# Patient Record
Sex: FEMALE | Race: WHITE | URBAN - METROPOLITAN AREA
[De-identification: names, ages, dates, MRNs, and addresses within clinical notes are randomized per-mention and may not be internally consistent; named-entity substitution may affect disease eponyms.]

---

## 2018-07-25 ENCOUNTER — IMPORTED ENCOUNTER (OUTPATIENT)
Dept: URBAN - METROPOLITAN AREA CLINIC 9 | Facility: CLINIC | Age: 68
End: 2018-07-25

## 2019-06-13 NOTE — PATIENT DISCUSSION
- Refractive goal: [place]. Does not mind wearing readers for near. Would not mind wearing glasses full time.

## 2019-08-15 NOTE — PATIENT DISCUSSION
Continue: prednisol ace-gatiflox-bromfen (prednisol ace-gatiflox-bromfen): drops,suspension: 1-0.5-0.075% 1 drop three times a day into affected eye 07-

## 2019-08-21 NOTE — PATIENT DISCUSSION
- Refractive goal: [place]. Does not mind wearing readers for near. Would not mind wearing glasses full time.

## 2019-10-02 NOTE — PATIENT DISCUSSION
- With rapidly magali anterior capsular phimosis, rhexis size now 1.2 mm.  Patient notices reduction in her vision

## 2019-10-02 NOTE — PATIENT DISCUSSION
- Will re-evaluate in 2 weeks.  Consider YAG of the anterior capsule to create relaxing incisions that day

## 2019-10-21 NOTE — PATIENT DISCUSSION
Continue: prednisol ace-gatiflox-bromfen (prednisol ace-gatiflox-bromfen): drops,suspension: 1-0.5-0.075% 1 drop three times a day into affected eye 07- week(s)

## 2019-10-21 NOTE — PATIENT DISCUSSION
- Vision has been worsening OS over the past few weeks due to severe anterior capsuar phimosis, rhexis now only 1.0 mm in diameter, visual acuity 20/100

## 2019-10-21 NOTE — PATIENT DISCUSSION
- YAG laser performed today on the anterior capsular phimosis. 7 relaxing incisions were created, with notable enlargement of the rhexis size.  Patient tolerated the procedure well

## 2019-11-07 NOTE — PATIENT DISCUSSION
- Vision has been worsening OS over the past few weeks, initially thought to be due only to severe anterior capsuar phimosis.  Now s/p YAG of anterior capsular phimosis, with improvement of rhexis size from 1 mm to 3.5 mm

## 2019-11-14 NOTE — PATIENT DISCUSSION
- CME present on OCT macula OS. Trace IRF OD. Will observe for now.  Advised patient to call with any changes in vision

## 2019-12-12 NOTE — PATIENT DISCUSSION
Stopped Today: prednisol ace-gatiflox-bromfen (prednisol ace-gatiflox-bromfen): drops,suspension: 1-0.5-0.075% 1 drop three times a day into affected eye 07-

## 2020-08-06 NOTE — PATIENT DISCUSSION
- NORMAL OCT MAC TODAY Add 04390 Cpt? (Important Note: In 2017 The Use Of 51462 Is Being Tracked By Cms To Determine Future Global Period Reimbursement For Global Periods): yes Detail Level: Detailed

## 2021-07-01 ENCOUNTER — IMPORTED ENCOUNTER (OUTPATIENT)
Dept: URBAN - METROPOLITAN AREA CLINIC 9 | Facility: CLINIC | Age: 71
End: 2021-07-01

## 2021-07-12 ENCOUNTER — IMPORTED ENCOUNTER (OUTPATIENT)
Dept: URBAN - METROPOLITAN AREA CLINIC 9 | Facility: CLINIC | Age: 71
End: 2021-07-12

## 2021-08-26 ENCOUNTER — IMPORTED ENCOUNTER (OUTPATIENT)
Dept: URBAN - METROPOLITAN AREA CLINIC 9 | Facility: CLINIC | Age: 71
End: 2021-08-26

## 2021-09-20 ENCOUNTER — IMPORTED ENCOUNTER (OUTPATIENT)
Dept: URBAN - METROPOLITAN AREA CLINIC 9 | Facility: CLINIC | Age: 71
End: 2021-09-20

## 2021-10-18 ASSESSMENT — VISUAL ACUITY
OD_SC: 20/30 SN
OD_SC: 20/30 SN
OD_CC: 20/30 SN
OS_CC: 20/30 SN
OD_CC: 20/40 SN
OS_CC: 20/60 -2 SN
OD_CC: 20/25 SN
OD_CC: 20/30 - SN
OS_CC: 20/50 SN
OS_CC: 20/40 - SN
OD_CC: 20/30 SN
OD_CC: 20/30 SN
OD_CC: 20/40 SN
OS_CC: 20/30 -2 SN
OD_CC: 20/50 -2 SN
OS_SC: 20/40 SN
OD_CC: 20/60 SN
OS_CC: 20/50 - SN

## 2021-10-18 ASSESSMENT — KERATOMETRY
OD_K2POWER_DIOPTERS: 42
OS_K1POWER_DIOPTERS: 41.5
OS_AXISANGLE2_DEGREES: 101
OD_K2POWER_DIOPTERS: 42.5
OS_K2POWER_DIOPTERS: 41.75
OS_AXISANGLE2_DEGREES: 136
OS_AXISANGLE_DEGREES: 46
OD_AXISANGLE2_DEGREES: 11
OD_AXISANGLE_DEGREES: 101
OD_AXISANGLE2_DEGREES: 13
OS_AXISANGLE_DEGREES: 11
OS_K1POWER_DIOPTERS: 41
OD_AXISANGLE_DEGREES: 103
OS_K2POWER_DIOPTERS: 41.75
OD_K1POWER_DIOPTERS: 41
OD_K1POWER_DIOPTERS: 41

## 2021-10-18 ASSESSMENT — TONOMETRY
OD_IOP_MMHG: 13
OS_IOP_MMHG: 13
OS_IOP_MMHG: 12
OS_IOP_MMHG: 13
OD_IOP_MMHG: 12
OD_IOP_MMHG: 13

## 2022-09-15 ENCOUNTER — ESTABLISHED PATIENT (OUTPATIENT)
Dept: URBAN - METROPOLITAN AREA CLINIC 4 | Facility: CLINIC | Age: 72
End: 2022-09-15

## 2022-09-15 DIAGNOSIS — E11.3293: ICD-10-CM

## 2022-09-15 DIAGNOSIS — H26.493: ICD-10-CM

## 2022-09-15 DIAGNOSIS — H11.153: ICD-10-CM

## 2022-09-15 PROCEDURE — 92014 COMPRE OPH EXAM EST PT 1/>: CPT

## 2022-09-15 PROCEDURE — 92015 DETERMINE REFRACTIVE STATE: CPT

## 2022-09-15 ASSESSMENT — VISUAL ACUITY
OD_SC: 20/50
OS_SC: 20/70
OS_GLARE: 20/100
OU_SC: 20/60
OD_GLARE: 20/100-1

## 2022-09-15 ASSESSMENT — KERATOMETRY
OS_K1POWER_DIOPTERS: 41.00
OD_K2POWER_DIOPTERS: 42.25
OD_AXISANGLE2_DEGREES: 18
OS_K2POWER_DIOPTERS: 41.75
OS_AXISANGLE2_DEGREES: 141
OD_AXISANGLE_DEGREES: 108
OD_K1POWER_DIOPTERS: 41.25
OS_AXISANGLE_DEGREES: 51

## 2022-09-15 ASSESSMENT — TONOMETRY
OD_IOP_MMHG: 14
OS_IOP_MMHG: 14

## 2022-11-02 ENCOUNTER — POST-OP (OUTPATIENT)
Dept: URBAN - METROPOLITAN AREA CLINIC 4 | Facility: CLINIC | Age: 72
End: 2022-11-02

## 2022-11-02 DIAGNOSIS — E11.3293: ICD-10-CM

## 2022-11-02 DIAGNOSIS — H26.493: ICD-10-CM

## 2022-11-02 DIAGNOSIS — H11.153: ICD-10-CM

## 2022-11-02 PROCEDURE — 99024 POSTOP FOLLOW-UP VISIT: CPT

## 2022-11-02 ASSESSMENT — VISUAL ACUITY
OS_SC: 20/70+1
OD_SC: 20/50
OU_SC: 20/50

## 2022-11-02 ASSESSMENT — TONOMETRY
OD_IOP_MMHG: 14
OS_IOP_MMHG: 15

## 2025-06-18 ENCOUNTER — COMPREHENSIVE EXAM (OUTPATIENT)
Age: 75
End: 2025-06-18

## 2025-06-18 DIAGNOSIS — E11.3293: ICD-10-CM

## 2025-06-18 DIAGNOSIS — H11.153: ICD-10-CM

## 2025-06-18 PROCEDURE — 92014 COMPRE OPH EXAM EST PT 1/>: CPT

## 2025-06-18 PROCEDURE — 92134 CPTRZ OPH DX IMG PST SGM RTA: CPT

## 2025-06-18 PROCEDURE — 92250 FUNDUS PHOTOGRAPHY W/I&R: CPT | Mod: NC

## 2025-06-18 PROCEDURE — 92015 DETERMINE REFRACTIVE STATE: CPT

## 2025-07-08 ENCOUNTER — COMPREHENSIVE EXAM (OUTPATIENT)
Age: 75
End: 2025-07-08

## 2025-07-08 DIAGNOSIS — H43.813: ICD-10-CM

## 2025-07-08 DIAGNOSIS — E11.3513: ICD-10-CM

## 2025-07-08 PROCEDURE — 99214 OFFICE O/P EST MOD 30 MIN: CPT | Mod: 25

## 2025-07-08 PROCEDURE — J9035JZ AVASTIN, NO DRUG WASTE

## 2025-07-08 PROCEDURE — 92134 CPTRZ OPH DX IMG PST SGM RTA: CPT

## 2025-07-08 PROCEDURE — 92201 OPSCPY EXTND RTA DRAW UNI/BI: CPT

## 2025-07-08 PROCEDURE — 67028 INJECTION EYE DRUG: CPT | Mod: 50,RT

## 2025-08-05 ENCOUNTER — CLINIC PROCEDURE ONLY (OUTPATIENT)
Age: 75
End: 2025-08-05

## 2025-08-05 DIAGNOSIS — E11.3513: ICD-10-CM

## 2025-08-05 PROCEDURE — 67028 INJECTION EYE DRUG: CPT | Mod: 50,RT

## 2025-08-05 PROCEDURE — 92134 CPTRZ OPH DX IMG PST SGM RTA: CPT

## 2025-08-05 PROCEDURE — J9035JZ AVASTIN, NO DRUG WASTE
